# Patient Record
Sex: MALE | Race: WHITE | Employment: FULL TIME | URBAN - METROPOLITAN AREA
[De-identification: names, ages, dates, MRNs, and addresses within clinical notes are randomized per-mention and may not be internally consistent; named-entity substitution may affect disease eponyms.]

---

## 2021-03-28 ENCOUNTER — HOSPITAL ENCOUNTER (EMERGENCY)
Age: 36
Discharge: HOME | End: 2021-03-28
Attending: EMERGENCY MEDICINE
Payer: COMMERCIAL

## 2021-03-28 VITALS
DIASTOLIC BLOOD PRESSURE: 116 MMHG | HEART RATE: 86 BPM | BODY MASS INDEX: 39.17 KG/M2 | TEMPERATURE: 98.6 F | OXYGEN SATURATION: 99 % | SYSTOLIC BLOOD PRESSURE: 159 MMHG | HEIGHT: 75 IN | RESPIRATION RATE: 21 BRPM | WEIGHT: 315 LBS

## 2021-03-28 DIAGNOSIS — I10 ESSENTIAL HYPERTENSION: ICD-10-CM

## 2021-03-28 DIAGNOSIS — T15.91XA FOREIGN BODY OF RIGHT EYE, INITIAL ENCOUNTER: Primary | ICD-10-CM

## 2021-03-28 LAB
ANION GAP SERPL CALCULATED.3IONS-SCNC: 10 MMOL/L (ref 4–16)
BASOPHILS ABSOLUTE: 0 K/CU MM
BASOPHILS RELATIVE PERCENT: 0.6 % (ref 0–1)
BUN BLDV-MCNC: 18 MG/DL (ref 6–23)
CALCIUM SERPL-MCNC: 9.1 MG/DL (ref 8.3–10.6)
CHLORIDE BLD-SCNC: 103 MMOL/L (ref 99–110)
CO2: 26 MMOL/L (ref 21–32)
CREAT SERPL-MCNC: 1 MG/DL (ref 0.9–1.3)
DIFFERENTIAL TYPE: ABNORMAL
EOSINOPHILS ABSOLUTE: 0.1 K/CU MM
EOSINOPHILS RELATIVE PERCENT: 1.3 % (ref 0–3)
GFR AFRICAN AMERICAN: >60 ML/MIN/1.73M2
GFR NON-AFRICAN AMERICAN: >60 ML/MIN/1.73M2
GLUCOSE BLD-MCNC: 86 MG/DL (ref 70–99)
HCT VFR BLD CALC: 44.9 % (ref 42–52)
HEMOGLOBIN: 14.3 GM/DL (ref 13.5–18)
IMMATURE NEUTROPHIL %: 1 % (ref 0–0.43)
LYMPHOCYTES ABSOLUTE: 2.2 K/CU MM
LYMPHOCYTES RELATIVE PERCENT: 35.3 % (ref 24–44)
MCH RBC QN AUTO: 27.9 PG (ref 27–31)
MCHC RBC AUTO-ENTMCNC: 31.8 % (ref 32–36)
MCV RBC AUTO: 87.5 FL (ref 78–100)
MONOCYTES ABSOLUTE: 0.6 K/CU MM
MONOCYTES RELATIVE PERCENT: 10 % (ref 0–4)
NUCLEATED RBC %: 0 %
PDW BLD-RTO: 13.4 % (ref 11.7–14.9)
PLATELET # BLD: 325 K/CU MM (ref 140–440)
PMV BLD AUTO: 9.1 FL (ref 7.5–11.1)
POTASSIUM SERPL-SCNC: 4.1 MMOL/L (ref 3.5–5.1)
RBC # BLD: 5.13 M/CU MM (ref 4.6–6.2)
SEGMENTED NEUTROPHILS ABSOLUTE COUNT: 3.2 K/CU MM
SEGMENTED NEUTROPHILS RELATIVE PERCENT: 51.8 % (ref 36–66)
SODIUM BLD-SCNC: 139 MMOL/L (ref 135–145)
TOTAL IMMATURE NEUTOROPHIL: 0.06 K/CU MM
TOTAL NUCLEATED RBC: 0 K/CU MM
TROPONIN T: <0.01 NG/ML
WBC # BLD: 6.2 K/CU MM (ref 4–10.5)

## 2021-03-28 PROCEDURE — 85025 COMPLETE CBC W/AUTO DIFF WBC: CPT

## 2021-03-28 PROCEDURE — 80048 BASIC METABOLIC PNL TOTAL CA: CPT

## 2021-03-28 PROCEDURE — 6370000000 HC RX 637 (ALT 250 FOR IP): Performed by: EMERGENCY MEDICINE

## 2021-03-28 PROCEDURE — 6370000000 HC RX 637 (ALT 250 FOR IP): Performed by: PHYSICIAN ASSISTANT

## 2021-03-28 PROCEDURE — 93005 ELECTROCARDIOGRAM TRACING: CPT | Performed by: PHYSICIAN ASSISTANT

## 2021-03-28 PROCEDURE — 84484 ASSAY OF TROPONIN QUANT: CPT

## 2021-03-28 PROCEDURE — 99285 EMERGENCY DEPT VISIT HI MDM: CPT

## 2021-03-28 RX ORDER — ERYTHROMYCIN 5 MG/G
OINTMENT OPHTHALMIC
Qty: 1 TUBE | Refills: 0 | Status: SHIPPED | OUTPATIENT
Start: 2021-03-28 | End: 2021-04-07

## 2021-03-28 RX ORDER — ERYTHROMYCIN 5 MG/G
OINTMENT OPHTHALMIC ONCE
Status: DISCONTINUED | OUTPATIENT
Start: 2021-03-28 | End: 2021-03-28

## 2021-03-28 RX ORDER — LIDOCAINE HYDROCHLORIDE 20 MG/ML
10 INJECTION, SOLUTION INFILTRATION; PERINEURAL ONCE
Status: DISCONTINUED | OUTPATIENT
Start: 2021-03-28 | End: 2021-03-28

## 2021-03-28 RX ORDER — TETRACAINE HYDROCHLORIDE 5 MG/ML
2 SOLUTION OPHTHALMIC ONCE
Status: COMPLETED | OUTPATIENT
Start: 2021-03-28 | End: 2021-03-28

## 2021-03-28 RX ORDER — TETRACAINE HYDROCHLORIDE 5 MG/ML
1 SOLUTION OPHTHALMIC ONCE
Status: DISCONTINUED | OUTPATIENT
Start: 2021-03-28 | End: 2021-03-29 | Stop reason: HOSPADM

## 2021-03-28 RX ORDER — ERYTHROMYCIN 5 MG/G
OINTMENT OPHTHALMIC ONCE
Status: COMPLETED | OUTPATIENT
Start: 2021-03-28 | End: 2021-03-28

## 2021-03-28 RX ADMIN — FLUORESCEIN SODIUM 1 MG: 1 STRIP OPHTHALMIC at 19:50

## 2021-03-28 RX ADMIN — TETRACAINE HYDROCHLORIDE 2 DROP: 5 SOLUTION OPHTHALMIC at 19:50

## 2021-03-28 RX ADMIN — ERYTHROMYCIN: 5 OINTMENT OPHTHALMIC at 22:22

## 2021-03-28 NOTE — ED TRIAGE NOTES
Patient with right eye pain. 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

## 2021-03-29 ENCOUNTER — OFFICE VISIT (OUTPATIENT)
Dept: INTERNAL MEDICINE CLINIC | Age: 36
End: 2021-03-29
Payer: COMMERCIAL

## 2021-03-29 VITALS
TEMPERATURE: 98.4 F | DIASTOLIC BLOOD PRESSURE: 94 MMHG | HEART RATE: 68 BPM | OXYGEN SATURATION: 96 % | HEIGHT: 75 IN | BODY MASS INDEX: 39.17 KG/M2 | RESPIRATION RATE: 16 BRPM | SYSTOLIC BLOOD PRESSURE: 158 MMHG | WEIGHT: 315 LBS

## 2021-03-29 DIAGNOSIS — I10 HTN (HYPERTENSION), BENIGN: Primary | ICD-10-CM

## 2021-03-29 PROCEDURE — 93010 ELECTROCARDIOGRAM REPORT: CPT | Performed by: INTERNAL MEDICINE

## 2021-03-29 PROCEDURE — 99203 OFFICE O/P NEW LOW 30 MIN: CPT | Performed by: NURSE PRACTITIONER

## 2021-03-29 RX ORDER — LISINOPRIL 10 MG/1
10 TABLET ORAL DAILY
Qty: 30 TABLET | Refills: 11 | Status: SHIPPED | OUTPATIENT
Start: 2021-03-29

## 2021-03-29 ASSESSMENT — PATIENT HEALTH QUESTIONNAIRE - PHQ9
SUM OF ALL RESPONSES TO PHQ9 QUESTIONS 1 & 2: 0
1. LITTLE INTEREST OR PLEASURE IN DOING THINGS: 0
SUM OF ALL RESPONSES TO PHQ QUESTIONS 1-9: 0
SUM OF ALL RESPONSES TO PHQ QUESTIONS 1-9: 0
2. FEELING DOWN, DEPRESSED OR HOPELESS: 0

## 2021-03-29 NOTE — ED PROVIDER NOTES
Triage Chief Complaint:   Eye Injury (right eye)    Teller:  Today in the ED I had the pleasure of caring for Tee Boswell who is a 28 y.o. male that presents today to the emergency department complaining of right sided eye foreign body. Context is patient states he has foreign body sensation x2 days. Began yesterday afternoon. He is not exactly sure what he got in his eye denies any blurred vision. No trauma. No headache neck pain stiffness fever chills nausea from diarrhea. No associated pain just irritation    ROS:  REVIEW OF SYSTEMS    At least 10 systems reviewed      All other review of systems are negative  See HPI and nursing notes for additional information       History reviewed. No pertinent past medical history. Past Surgical History:   Procedure Laterality Date    KNEE SURGERY       History reviewed. No pertinent family history. Social History     Socioeconomic History    Marital status: Single     Spouse name: Not on file    Number of children: Not on file    Years of education: Not on file    Highest education level: Not on file   Occupational History    Not on file   Social Needs    Financial resource strain: Not on file    Food insecurity     Worry: Not on file     Inability: Not on file    Transportation needs     Medical: Not on file     Non-medical: Not on file   Tobacco Use    Smoking status: Current Some Day Smoker   Substance and Sexual Activity    Alcohol use:  Yes    Drug use: No    Sexual activity: Not on file   Lifestyle    Physical activity     Days per week: Not on file     Minutes per session: Not on file    Stress: Not on file   Relationships    Social connections     Talks on phone: Not on file     Gets together: Not on file     Attends Yarsanism service: Not on file     Active member of club or organization: Not on file     Attends meetings of clubs or organizations: Not on file     Relationship status: Not on file    Intimate partner violence     Fear of current or ex partner: Not on file     Emotionally abused: Not on file     Physically abused: Not on file     Forced sexual activity: Not on file   Other Topics Concern    Not on file   Social History Narrative    Not on file     Current Facility-Administered Medications   Medication Dose Route Frequency Provider Last Rate Last Admin    fluorescein ophthalmic strip 1 mg  1 strip Ophthalmic Once Palma Umanzor PA-C        tetracaine (TETRAVISC) 0.5 % ophthalmic solution 1 drop  1 drop Ophthalmic Once Palma Umanzor PA-C         Current Outpatient Medications   Medication Sig Dispense Refill    erythromycin (ROMYCIN) 5 MG/GM ophthalmic ointment Place 1 cm in lower eyelid twice daily x5 days 1 Tube 0     No Known Allergies    Nursing Notes Reviewed    Physical Exam:  ED Triage Vitals   Enc Vitals Group      BP 03/28/21 1901 (!) 179/100      Pulse 03/28/21 1840 83      Resp 03/28/21 1840 18      Temp 03/28/21 1840 98.5 °F (36.9 °C)      Temp Source 03/28/21 1840 Oral      SpO2 03/28/21 1840 98 %      Weight 03/28/21 1840 (!) 320 lb (145.2 kg)      Height 03/28/21 1840 6' 3\" (1.905 m)      Head Circumference --       Peak Flow --       Pain Score --       Pain Loc --       Pain Edu? --       Excl. in [address]? --      General :Patient is awake alert oriented person place and time no acute distress nontoxic appearing  HEENT: Pupils are equally round and reactive to light extraocular motors are intact conjunctivae clear sclerae white there is no injection no icterus. Nose without any rhinorrhea or epistaxis. Oral mucosa is moist no exudate buccal mucosa shows no ulcerations. Uvula is midline    EYE(S): Right  Visual acuity reviewed per the nurses chart. -OPHTHALMIC EXAM: PERRL. EOMI. Sclera non-icteric and non-injected. Flourescein shows no abnormal uptake. No evidence of a Maria Teresa sign. Small pinpoint foreign body noted along the cornea.   Fundoscopic exam shows a clear vitrous humor and no papilledema. -SPLIT LAMP RIGHT: No flare or cells, no hypopyon and no hyphema. Lizzie-orbital region non-erythematous and non-tender. Neck: Neck is supple full range of motion trachea midline thyroid nonpalpable  Lymphatic: There is no submandibular or cervical adenopathy appreciated. Procedure note-foreign body removal:  Risk benefits of procedure were discussed with patient. Anesthetized right globe with tetracaine 2 drops. Then fluorescein with saline mixture placed in the eye. Under black light magnified foreign body was removed using scoop technique with 18-gauge needle. Foreign body removed successfully with without resistance.   Complications-none  Does not tolerate procedure well  I have reviewed and interpreted all of the currently available lab results from this visit (if applicable):  Results for orders placed or performed during the hospital encounter of 03/28/21   BMP   Result Value Ref Range    Sodium 139 135 - 145 MMOL/L    Potassium 4.1 3.5 - 5.1 MMOL/L    Chloride 103 99 - 110 mMol/L    CO2 26 21 - 32 MMOL/L    Anion Gap 10 4 - 16    BUN 18 6 - 23 MG/DL    CREATININE 1.0 0.9 - 1.3 MG/DL    Glucose 86 70 - 99 MG/DL    Calcium 9.1 8.3 - 10.6 MG/DL    GFR Non-African American >60 >60 mL/min/1.73m2    GFR African American >60 >60 mL/min/1.73m2   Troponin   Result Value Ref Range    Troponin T <0.010 <0.01 NG/ML   CBC auto diff   Result Value Ref Range    WBC 6.2 4.0 - 10.5 K/CU MM    RBC 5.13 4.6 - 6.2 M/CU MM    Hemoglobin 14.3 13.5 - 18.0 GM/DL    Hematocrit 44.9 42 - 52 %    MCV 87.5 78 - 100 FL    MCH 27.9 27 - 31 PG    MCHC 31.8 (L) 32.0 - 36.0 %    RDW 13.4 11.7 - 14.9 %    Platelets 235 390 - 677 K/CU MM    MPV 9.1 7.5 - 11.1 FL    Differential Type AUTOMATED DIFFERENTIAL     Segs Relative 51.8 36 - 66 %    Lymphocytes % 35.3 24 - 44 %    Monocytes % 10.0 (H) 0 - 4 %    Eosinophils % 1.3 0 - 3 %    Basophils % 0.6 0 - 1 %    Segs Absolute 3.2 K/CU MM    Lymphocytes Absolute 2.2 K/CU MM Monocytes Absolute 0.6 K/CU MM    Eosinophils Absolute 0.1 K/CU MM    Basophils Absolute 0.0 K/CU MM    Nucleated RBC % 0.0 %    Total Nucleated RBC 0.0 K/CU MM    Total Immature Neutrophil 0.06 K/CU MM    Immature Neutrophil % 1.0 (H) 0 - 0.43 %   EKG 12 Lead   Result Value Ref Range    Ventricular Rate 74 BPM    Atrial Rate 74 BPM    P-R Interval 166 ms    QRS Duration 114 ms    Q-T Interval 374 ms    QTc Calculation (Bazett) 415 ms    P Axis 49 degrees    R Axis -27 degrees    T Axis 32 degrees    Diagnosis       Normal sinus rhythm  Normal ECG  No previous ECGs available        Radiographs (if obtained):  [] The following radiograph was interpreted by myself in the absence of a radiologist:   [] Radiologist's Report Reviewed:  No orders to display       EKG (if obtained):   Please See Note of attending physician for EKG interpretation. Chart review shows recent radiograph(s):  No results found. MDM:     Interventions given this visit:   Orders Placed This Encounter   Medications    fluorescein ophthalmic strip 1 mg    tetracaine (TETRAVISC) 0.5 % ophthalmic solution 2 drop    fluorescein ophthalmic strip 1 mg    tetracaine (TETRAVISC) 0.5 % ophthalmic solution 1 drop    DISCONTD: erythromycin (ROMYCIN) ophthalmic ointment    erythromycin (ROMYCIN) ophthalmic ointment    erythromycin (ROMYCIN) 5 MG/GM ophthalmic ointment     Sig: Place 1 cm in lower eyelid twice daily x5 days     Dispense:  1 Tube     Refill:  0    DISCONTD: lidocaine 2 % injection 10 mL       Patient presents today to the emergency department complaining of foreign body in her eye. Foreign body removed by myself successfully. He has no changes in vision. No evidence of globe rupture. No deep space infection noted      Dental finding patient has significant hypertension. 175/102. No history of hypertension in the past.  Does not have a PCP. Will follow up with his wife who has a PCP follow with his prior PCP.   No evidence of

## 2021-03-29 NOTE — PATIENT INSTRUCTIONS
Thank you for enrolling in [address] Please follow the instructions below to securely access your online medical record. [employer] allows you to send messages to your doctor, view your test results, renew your prescriptions, schedule appointments, and more. How Do I Sign Up? 1. In your Internet browser, go to [email or web address]  2. Click on the Sign Up Now link in the Sign In box. You will see the New Member Sign Up page. 3. Enter your [employer] Access Code exactly as it appears below. You will not need to use this code after youve completed the sign-up process. If you do not sign up before the expiration date, you must request a new code. [employer] Access Code: [email or web address]  Expires: 5/12/2021 10:41 PM    4. Enter your Social Security Number ([id number]) and Date of Birth (mm/dd/yyyy) as indicated and click Submit. You will be taken to the next sign-up page. 5. Create a [employer] ID. This will be your [employer] login ID and cannot be changed, so think of one that is secure and easy to remember. 6. Create a [employer] password. You can change your password at any time. 7. Enter your Password Reset Question and Answer. This can be used at a later time if you forget your password. 8. Enter your e-mail address. You will receive e-mail notification when new information is available in [address] 9. Click Sign Up. You can now view your medical record. Additional Information  If you have questions, please contact the physician practice where you receive care. Remember, [employer] is NOT to be used for urgent needs. For medical emergencies, dial 911. For questions regarding your [employer] account call [phone]. If you have a clinical question, please call your doctor's office.

## 2021-03-29 NOTE — ED NOTES
Pt medicated per EMAR. Denies any further needs at this time.       Rasheed Davies RN  03/28/21 8751

## 2021-03-29 NOTE — PROGRESS NOTES
Subjective:      Yocasta Fonseca is a 28 y.o. male who presents for evaluation of elevated blood pressures. Age at onset of elevated blood pressure:  yesterday. Cardiac symptoms: none. Patient denies: none. Cardiovascular risk factors: family history of premature cardiovascular disease, hypertension, male gender, obesity (BMI >= 30 kg/m2) and smoking/ tobacco exposure. Use of agents associated with hypertension: none. History of target organ damage: none. No past medical history on file. There are no active problems to display for this patient. Past Surgical History:   Procedure Laterality Date    KNEE SURGERY       Current Outpatient Medications   Medication Sig Dispense Refill    erythromycin (ROMYCIN) 5 MG/GM ophthalmic ointment Place 1 cm in lower eyelid twice daily x5 days 1 Tube 0     No current facility-administered medications for this visit. Review of Systems  Pertinent items are noted in HPI. Objective:      BP (!) 158/94 (Site: Right Upper Arm, Position: Sitting)   Pulse 68   Temp 98.4 °F (36.9 °C)   Resp 16   Ht 6' 3\" (1.905 m)   Wt (!) 371 lb (168.3 kg) Comment: with boots  SpO2 96%   BMI 46.37 kg/m²   General appearance: alert, appears stated age and cooperative  Eyes: positive findings: sclera red in the right eye due to foreign object being removed from it yesterday   Lungs: clear to auscultation bilaterally  Heart: regular rate and rhythm, S1, S2 normal, no murmur, click, rub or gallop      Assessment:      Hypertension, stage 1 . Evidence of target organ damage: none. Plan:      Due to pt having strong family history of HTN and cardiac disease I will start pt on BP medication at this time. Pt has know that he has had high blood pressure for a while, has not been treated. Pt does not have a PCP, currently looking. Suggestions given. Diagnosis Orders   1.  HTN (hypertension), benign  lisinopril (PRINIVIL;ZESTRIL) 10 MG tablet   Pt is a , he has to get his BP checked periodically there and has been on the borderline high for last few months.

## 2021-03-29 NOTE — ED NOTES
Reviewed discharge paperwork with pt. Answered all questions. Pt verbalized understanding.         Jann Jackson RN  03/28/21 2334

## 2021-03-30 LAB
EKG ATRIAL RATE: 74 BPM
EKG DIAGNOSIS: NORMAL
EKG P AXIS: 49 DEGREES
EKG P-R INTERVAL: 166 MS
EKG Q-T INTERVAL: 374 MS
EKG QRS DURATION: 114 MS
EKG QTC CALCULATION (BAZETT): 415 MS
EKG R AXIS: -27 DEGREES
EKG T AXIS: 32 DEGREES
EKG VENTRICULAR RATE: 74 BPM

## 2022-04-09 DIAGNOSIS — I10 HTN (HYPERTENSION), BENIGN: ICD-10-CM

## 2022-04-09 RX ORDER — LISINOPRIL 10 MG/1
TABLET ORAL
Qty: 90 TABLET | Refills: 3 | OUTPATIENT
Start: 2022-04-09